# Patient Record
Sex: MALE | Race: WHITE
[De-identification: names, ages, dates, MRNs, and addresses within clinical notes are randomized per-mention and may not be internally consistent; named-entity substitution may affect disease eponyms.]

---

## 2021-11-17 LAB
BUN BLD-MCNC: 16 MG/DL (ref 7–18)
GLUCOSE SERPLBLD-MCNC: 97 MG/DL (ref 74–106)
HCT VFR BLD CALC: 45.6 % (ref 39.6–49)
INR BLD: 0.97
LYMPHOCYTES # SPEC AUTO: 0.9 K/UL (ref 0.7–4.9)
PMV BLD: 7.2 FL (ref 7.6–11.3)
POTASSIUM SERPL-SCNC: 4.5 MMOL/L (ref 3.5–5.1)
RBC # BLD: 4.88 M/UL (ref 4.33–5.43)

## 2021-11-17 NOTE — RAD REPORT
EXAM DESCRIPTION:  RAD - Chest Pa And Lat (2 Views) - 11/17/2021 9:38 am

 

CLINICAL HISTORY:  Pre Op pending heart catheterization

 

COMPARISON:  CHEST PA AND LAT 2 VIEW dated 4/24/2014; CHEST PA AND LAT 2 VIEW dated 9/17/2012

 

FINDINGS:  Lines: None.

Lungs: No evidence of edema or pneumonia.

Pleural: No significant pleural effusions or pneumothorax.

Cardiac: The heart size is within normal limits.

Bones: No acute fractures.

Other:

 

IMPRESSION:  No acute cardiopulmonary disease.

## 2021-11-17 NOTE — EKG
Test Date:    2021-11-17               Test Time:    08:54:45

Technician:   MADELEINE                                     

                                                     

MEASUREMENT RESULTS:                                       

Intervals:                                           

Rate:         49                                     

AR:           158                                    

QRSD:         78                                     

QT:           464                                    

QTc:          419                                    

Axis:                                                

P:            60                                     

AR:           158                                    

QRS:          72                                     

T:            64                                     

                                                     

INTERPRETIVE STATEMENTS:                                       

                                                     

Marked sinus bradycardia with premature atrial complexes

Abnormal ECG

No previous ECG available for comparison



Electronically Signed On 11-17-21 16:56:56 CST by Frank Fry

## 2021-11-18 ENCOUNTER — HOSPITAL ENCOUNTER (OUTPATIENT)
Dept: HOSPITAL 97 - CCL | Age: 76
Discharge: HOME | End: 2021-11-18
Attending: INTERNAL MEDICINE
Payer: COMMERCIAL

## 2021-11-18 VITALS — DIASTOLIC BLOOD PRESSURE: 75 MMHG | SYSTOLIC BLOOD PRESSURE: 152 MMHG

## 2021-11-18 VITALS — OXYGEN SATURATION: 97 %

## 2021-11-18 VITALS — TEMPERATURE: 97.2 F

## 2021-11-18 DIAGNOSIS — Z20.822: ICD-10-CM

## 2021-11-18 DIAGNOSIS — E11.9: ICD-10-CM

## 2021-11-18 DIAGNOSIS — Z82.49: ICD-10-CM

## 2021-11-18 DIAGNOSIS — Z87.891: ICD-10-CM

## 2021-11-18 DIAGNOSIS — I10: ICD-10-CM

## 2021-11-18 DIAGNOSIS — R06.02: Primary | ICD-10-CM

## 2021-11-18 PROCEDURE — 82947 ASSAY GLUCOSE BLOOD QUANT: CPT

## 2021-11-18 PROCEDURE — 93005 ELECTROCARDIOGRAM TRACING: CPT

## 2021-11-18 PROCEDURE — 85610 PROTHROMBIN TIME: CPT

## 2021-11-18 PROCEDURE — 93460 R&L HRT ART/VENTRICLE ANGIO: CPT

## 2021-11-18 PROCEDURE — 85025 COMPLETE CBC W/AUTO DIFF WBC: CPT

## 2021-11-18 PROCEDURE — 85730 THROMBOPLASTIN TIME PARTIAL: CPT

## 2021-11-18 PROCEDURE — 71046 X-RAY EXAM CHEST 2 VIEWS: CPT

## 2021-11-18 PROCEDURE — 80048 BASIC METABOLIC PNL TOTAL CA: CPT

## 2021-11-18 PROCEDURE — 36415 COLL VENOUS BLD VENIPUNCTURE: CPT

## 2021-11-18 NOTE — OP
Date of Procedure:  11/18/2021



Surgeon:  SHAE SINGH



Procedures Performed:  

1.Selective coronary angiogram.

2.Left heart catheterization.

3.Right heart catheterization.



Indication:  Significant dyspnea on exertion.



Access:  

1.Right radial artery 6-[place] closed with TR band.

2.Right IJ 7-[place] closed with manual pressure.



Complications:  None.



Bleeding:  Less than 10 mL.



Description Of Procedure:  After risks, benefits, and alternatives were explained, the patient agreed
 to procedure and signed informed consent.  The patient was brought into the cardiac catheterization 
laboratory, prepped and draped in usual sterile fashion.  Then, I access right radial artery using pe
diatric micropuncture kit, placed 6-[place] Slender sheath, and took a 5-[place] Tiger 4.0 catheter int
o the aortic root, engaged the left main and right coronary artery, and then took standard views and 
subsequently advanced the catheter over the wire across aortic valve into the LV, recorded LVEDP.  Pu
llback did not record any gradient.  I took a 7-[place] balloon-tipped Lincoln-Rosana catheter through the 
IJ into the RA, RV, PA, which recorded waveform pressure and measured cardiac output through thermodi
lution method and then removed the sheath.  Manual pressure was used for hemostasis.



Findings:  

1.Left main is large, normal.

2.LAD; moderate size and normal.  All diagonal branches are normal.

3.Left circumflex is normal.

4.RCA is normal and dominant, circulation.

5.LVEDP of 13 mmHg. 

Right heart catheterization numbers:  RA pressure was 4.  RV pressure 25/1 with mean of 3.  PA pressu
re was 26/90 with mean of 16.  Pulmonary wedge pressure was 4 and cardiac output was 5 L/minute.



Conclusion:  

1.Normal coronary arteries.

2.Normal filling pressures and normal cardiac output.



Recommendation:  Consult Pulmonary to further evaluate the symptoms.





SR/MODL

DD:  11/18/2021 13:30:08Voice ID:  [id number]

DT:  11/18/2021 17:55:50Report ID:  [id number] Detail Level: Detailed

## 2022-05-11 LAB — UA DIPSTICK W REFLEX MICRO PNL UR: (no result)

## 2022-05-11 NOTE — RAD REPORT
EXAM DESCRIPTION:  RAD - Chest Pa And Lat (2 Views) - 5/11/2022 9:25 am

 

CLINICAL HISTORY:  pre op for surgery

Chest pain.

 

COMPARISON:  Chest Pa And Lat (2 Views) dated 11/17/2021; CHEST PA AND LAT 2 VIEW dated 4/24/2014; CH
EST PA AND LAT 2 VIEW dated 9/17/2012

 

FINDINGS:  The lungs are clear. The heart is normal in size. No displaced fractures.

 

IMPRESSION:  No acute or concerning finding suspected.

## 2022-05-12 NOTE — EKG
Test Date:    2022-05-11               Test Time:    08:23:32

Technician:   YAN                                     

                                                     

MEASUREMENT RESULTS:                                       

Intervals:                                           

Rate:         44                                     

AZ:           168                                    

QRSD:         78                                     

QT:           468                                    

QTc:          400                                    

Axis:                                                

P:            50                                     

AZ:           168                                    

QRS:          45                                     

T:            52                                     

                                                     

INTERPRETIVE STATEMENTS:                                       

                                                     

Sinus bradycardia

Otherwise normal ECG

Compared to ECG 11/17/2021 08:54:45

Atrial premature complex(es) no longer present



Electronically Signed On 05-12-22 07:43:36 CDT by Frank Fry

## 2022-05-13 LAB
ALBUMIN SERPL BCP-MCNC: 3.8 G/DL (ref 3.4–5)
ALP SERPL-CCNC: 86 U/L (ref 45–117)
ALT SERPL W P-5'-P-CCNC: 17 U/L (ref 12–78)
AST SERPL W P-5'-P-CCNC: 13 U/L (ref 15–37)
BUN BLD-MCNC: 17 MG/DL (ref 7–18)
GLUCOSE SERPLBLD-MCNC: 112 MG/DL (ref 74–106)
HCT VFR BLD CALC: 42.8 % (ref 39.6–49)
INR BLD: 0.92
LYMPHOCYTES # SPEC AUTO: 0.9 K/UL (ref 0.7–4.9)
PMV BLD: 7.1 FL (ref 7.6–11.3)
POTASSIUM SERPL-SCNC: 4.2 MMOL/L (ref 3.5–5.1)
RBC # BLD: 4.66 M/UL (ref 4.33–5.43)

## 2022-05-17 ENCOUNTER — HOSPITAL ENCOUNTER (OUTPATIENT)
Dept: HOSPITAL 97 - OR | Age: 77
Setting detail: OBSERVATION
LOS: 1 days | Discharge: HOME HEALTH | End: 2022-05-18
Attending: ORTHOPAEDIC SURGERY | Admitting: ORTHOPAEDIC SURGERY
Payer: COMMERCIAL

## 2022-05-17 VITALS — OXYGEN SATURATION: 97 %

## 2022-05-17 VITALS — BODY MASS INDEX: 35.7 KG/M2

## 2022-05-17 DIAGNOSIS — M17.11: Primary | ICD-10-CM

## 2022-05-17 DIAGNOSIS — I10: ICD-10-CM

## 2022-05-17 DIAGNOSIS — K21.9: ICD-10-CM

## 2022-05-17 DIAGNOSIS — Z20.822: ICD-10-CM

## 2022-05-17 DIAGNOSIS — F41.9: ICD-10-CM

## 2022-05-17 DIAGNOSIS — F32.A: ICD-10-CM

## 2022-05-17 DIAGNOSIS — R73.03: ICD-10-CM

## 2022-05-17 DIAGNOSIS — E03.9: ICD-10-CM

## 2022-05-17 DIAGNOSIS — M06.9: ICD-10-CM

## 2022-05-17 DIAGNOSIS — Z80.9: ICD-10-CM

## 2022-05-17 DIAGNOSIS — J30.2: ICD-10-CM

## 2022-05-17 DIAGNOSIS — Z79.84: ICD-10-CM

## 2022-05-17 DIAGNOSIS — Z85.818: ICD-10-CM

## 2022-05-17 DIAGNOSIS — Z79.899: ICD-10-CM

## 2022-05-17 DIAGNOSIS — N40.0: ICD-10-CM

## 2022-05-17 DIAGNOSIS — Z96.642: ICD-10-CM

## 2022-05-17 DIAGNOSIS — E78.00: ICD-10-CM

## 2022-05-17 DIAGNOSIS — Z82.0: ICD-10-CM

## 2022-05-17 DIAGNOSIS — Z82.49: ICD-10-CM

## 2022-05-17 PROCEDURE — 97110 THERAPEUTIC EXERCISES: CPT

## 2022-05-17 PROCEDURE — 97139 UNLISTED THERAPEUTIC PX: CPT

## 2022-05-17 PROCEDURE — 86850 RBC ANTIBODY SCREEN: CPT

## 2022-05-17 PROCEDURE — 97116 GAIT TRAINING THERAPY: CPT

## 2022-05-17 PROCEDURE — 36415 COLL VENOUS BLD VENIPUNCTURE: CPT

## 2022-05-17 PROCEDURE — 83036 HEMOGLOBIN GLYCOSYLATED A1C: CPT

## 2022-05-17 PROCEDURE — 86900 BLOOD TYPING SEROLOGIC ABO: CPT

## 2022-05-17 PROCEDURE — 27447 TOTAL KNEE ARTHROPLASTY: CPT

## 2022-05-17 PROCEDURE — 88305 TISSUE EXAM BY PATHOLOGIST: CPT

## 2022-05-17 PROCEDURE — 0SRC069 REPLACEMENT OF RIGHT KNEE JOINT WITH OXIDIZED ZIRCONIUM ON POLYETHYLENE SYNTHETIC SUBSTITUTE, CEMENTED, OPEN APPROACH: ICD-10-PCS

## 2022-05-17 PROCEDURE — 85014 HEMATOCRIT: CPT

## 2022-05-17 PROCEDURE — 88311 DECALCIFY TISSUE: CPT

## 2022-05-17 PROCEDURE — 94010 BREATHING CAPACITY TEST: CPT

## 2022-05-17 PROCEDURE — 80053 COMPREHEN METABOLIC PANEL: CPT

## 2022-05-17 PROCEDURE — 85730 THROMBOPLASTIN TIME PARTIAL: CPT

## 2022-05-17 PROCEDURE — 85610 PROTHROMBIN TIME: CPT

## 2022-05-17 PROCEDURE — 93005 ELECTROCARDIOGRAM TRACING: CPT

## 2022-05-17 PROCEDURE — 85018 HEMOGLOBIN: CPT

## 2022-05-17 PROCEDURE — 81003 URINALYSIS AUTO W/O SCOPE: CPT

## 2022-05-17 PROCEDURE — 85025 COMPLETE CBC W/AUTO DIFF WBC: CPT

## 2022-05-17 PROCEDURE — 97161 PT EVAL LOW COMPLEX 20 MIN: CPT

## 2022-05-17 PROCEDURE — 86901 BLOOD TYPING SEROLOGIC RH(D): CPT

## 2022-05-17 PROCEDURE — 71046 X-RAY EXAM CHEST 2 VIEWS: CPT

## 2022-05-17 RX ADMIN — METOPROLOL TARTRATE SCH MG: 50 TABLET, FILM COATED ORAL at 21:30

## 2022-05-17 RX ADMIN — HYDROCODONE BITARTRATE AND ACETAMINOPHEN PRN TAB: 7.5; 325 TABLET ORAL at 18:28

## 2022-05-17 RX ADMIN — SODIUM CHLORIDE SCH MLS: 9 INJECTION, SOLUTION INTRAVENOUS at 16:47

## 2022-05-17 RX ADMIN — TRANEXAMIC ACID ONE MG: 100 INJECTION, SOLUTION INTRAVENOUS at 07:30

## 2022-05-17 RX ADMIN — TRANEXAMIC ACID ONE MG: 100 INJECTION, SOLUTION INTRAVENOUS at 09:30

## 2022-05-17 NOTE — P.BOP
Preoperative diagnosis: right knee arthritis


Primary procedure: right total knee arthoplasty


Anesthesia: General


Complications: None


Transferred to: Recovery Room


Condition: Good

## 2022-05-17 NOTE — XMS REPORT
Continuity of Care Document

                             Created on:May 17, 2022



Patient:JOHN SPAIN

Sex:Male

:1945

External Reference #:[id number]





Demographics







                          Address                   [address] [place] [address] [address] [place] [id number]

 

                          Home Phone                [phone]

 

                          Work Phone                [phone]

 

                          Email Address             [facility] 945593

 

                          Preferred Language        English

 

                          Marital Status            Unknown

 

                          Shinto Affiliation     Unknown

 

                          Race                      Unknown

 

                          Additional Race(s)        Unavailable

 

                          Ethnic Group              Unknown









Author







                          Organization              [facility]

t

 

                          Address                   [address] [place] [address] [address]



                                                    [place] [address]

 

                          Phone                     [phone]









Care Team Providers







                    Name                Role                Phone

 

                    John-Mbayo_A_AH    Attending Clinician Unavailable

 

                    John-Mbayo_A_AH    Admitting Clinician Unavailable









Payers







           Payer Name Policy Type Policy Number Effective Date Expiration Date S

bee

 

           [facility] [place] -            [id number]   2020            



           [employer]                        00:00:00              



           (MEDICARE                                              



           REPLACEMENT/ADVANT                                             



           AGE - HMO)                                             







Problems

This patient has no known problems.



Allergies, Adverse Reactions, Alerts

This patient has no known allergies or adverse reactions.



Medications

This patient has no known medications.



Procedures

This patient has no known procedures.



Encounters







        Start   End     Encounter Admission Attending Care    Care    Encounter 

Source



        Date/Time Date/Time Type    Type    Clinicians Facility Department ID   

   

 

        2022-10         Outpatient                 [facility]  [id number]

 Common



        11:46:01                                                 [id number]   [facility]

 

        2022         [facility]                 [facility]  [id number]

 Common



        13:54:04                                                 [id number]   [facility]

 

        2020 Outpatient         John-Mbayo VFP     VFP     [phone]

[phone] [facility]



        07:18:00 07:18:00                 _A_AH                   [id number]   Family



                                                                        Practic



                                                                        e

 

        2020 Outpatient         John-Mbayo VFP     VFP     [phone]

[phone] [facility]



        07:18:00 07:18:00                 _A_AH                   [id number]   Family



                                                                        Practic



                                                                        e

 

        2020 Outpatient         John-Mbayo VFP     VFP     [phone]

[phone] [facility]



        07:18:00 07:18:00                 _A_AH                   [id number]   Family



                                                                        Practic



                                                                        e







Results

This patient has no known results.

## 2022-05-17 NOTE — OP
Date of Procedure:  05/17/2022



Surgeon:  Hansel Carroll MD



Preoperative Diagnosis:  Right severe osteoarthritis of the knee.



Postoperative Diagnosis:  Right severe osteoarthritis of the knee.



Procedure:  Right total knee arthroplasty using the Vanguard PS cemented system.



Estimated Blood Loss:  100 cc.



Complications:  There were no complications.



Specimen:  No pathology specimen sent.



Indications For Operation:  Mr. Faria is a 76-year-old gentleman who unfortunately had significant
 pain and problems related to his right knee, which persisted despite conservative measures including
 medications and injections as well as activity modification.  X-rays demonstrate severe osteoarthrit
is primarily involving the medial side, but also coughing lateral as well.  Risks, benefits, and alte
rnatives to different methods of treating have been discussed with the patient including total knee a
rthroplasty and alternatives.  At this time, he elects for total knee arthroplasty and agrees to proc
eed.



Description Of Procedure:  The patient was taken to the operating room and placed in supine position.
  General anesthesia was obtained by Anesthesia staff.  Well-padded tourniquet placed on superior rig
ht thigh.  Right lower extremity was then prepped and draped in the usual fashion procedure.  Followi
ng this, the leg was then elevated and gently exsanguinated using an Ace wrap.  The knee was then elodia
t and tourniquet was raised.  A standard anterior incision was taken down carefully through skin and 
soft tissues.  Meticulous hemostasis being maintained using Bovie electrocautery.  The proper plane w
as encountered and gently swept off the anterior surface of the knee.  After this, the superior media
l pole of the patella was marked and a standard medial parapatellar arthrotomy was then performed.  T
here was liberation of approximately 20 cc of rather normal-appearing synovial fluid.  This was follo
wed by some removal of fat pad to allow for visualization.  Some of it appears to have scarred down w
ithin the knee.  The patella was then everted.  The knee was flexed and the medial lateral menisci, a
s well as the anterior cruciate ligament removed.  After this, attention was then turned to the femur
 and the medial side was completely worn down.  The intramedullary guide was then placed in standard 
fashion and the distal femur was then cut, was then sized to a size 70.  The remainder of the cuts we
re then made.  Attention was then turned to the tibia and the tibia was then cut in standard fashion.
  It was then sized to a size 83.  Trial components were in place.  The knee was then brought to exte
nsion.  It appeared to be balanced both medial and laterally.  The patella was then inspected and did
 not have much arthritic change, but there was some.  Decision was made to resurface this.  This was 
calipered and cut and the trial patellas in place.  The knee was then brought through full range of m
otion and the patella appeared to glide well.  The trial components were then removed.  The box was c
ut.  The bone plug was placed.  The tibia was punched.  The components with the exception of the poly
ethylene  were then placed using standard cementing technique, removal of any unsupported cement.  Th
e trial poly was then placed until the construct cartons.  Once this was done, was brought through fu
ll range of motion and again checked.  It appears to come to full extension without difficulty with f
ull flexion and appears to be stable in both varus and valgus stress.  This is all selected as a abdiel
l polyethylene.  Trial polyethylene was removed.  The final polyethylene was then placed, placed in a
 locking bar.  The wound was copiously irrigated and the fascia was closed in a watertight fashion us
ing heavy Ethibond sutures.  It was again irrigated and the skin was closed using Vicryl followed by 
staples.  The patient was then placed in a well-padded sterile dressing, awakened, and taken to [facility]
reese room in good condition.  There were no complications.





/MODL

DD:  05/17/2022 09:46:53Voice ID:  [id number]

DT:  05/17/2022 20:54:08Report ID:  [id number]

## 2022-05-18 VITALS — TEMPERATURE: 98.5 F | DIASTOLIC BLOOD PRESSURE: 61 MMHG | SYSTOLIC BLOOD PRESSURE: 118 MMHG

## 2022-05-18 LAB — HCT VFR BLD CALC: 38.9 % (ref 39.6–49)

## 2022-05-18 RX ADMIN — HYDROCODONE BITARTRATE AND ACETAMINOPHEN PRN TAB: 7.5; 325 TABLET ORAL at 00:11

## 2022-05-18 RX ADMIN — SODIUM CHLORIDE SCH MLS: 9 INJECTION, SOLUTION INTRAVENOUS at 09:46

## 2022-05-18 RX ADMIN — HYDROCODONE BITARTRATE AND ACETAMINOPHEN PRN TAB: 7.5; 325 TABLET ORAL at 11:39

## 2022-05-18 RX ADMIN — METOPROLOL TARTRATE SCH MG: 50 TABLET, FILM COATED ORAL at 09:46

## 2022-05-18 RX ADMIN — ENOXAPARIN SODIUM SCH MG: 30 INJECTION SUBCUTANEOUS at 05:32

## 2022-05-18 RX ADMIN — SODIUM CHLORIDE SCH MLS: 9 INJECTION, SOLUTION INTRAVENOUS at 00:12

## 2022-05-18 RX ADMIN — ENOXAPARIN SODIUM SCH MG: 30 INJECTION SUBCUTANEOUS at 09:47

## 2022-05-18 NOTE — P.DS
Discharge Date: 05/18/22


Disposition: ROUTINE DISCHARGE


Discharge Condition: GOOD


Reason for Admission: Right total knee arthroplasty


Consultations: 





[place]





- Problems


(1) Status post total right knee replacement


Current Visit: Yes   Status: Acute   





(2) Hypertension


Current Visit: Yes   Status: Acute   





(3) Rheumatoid arthritis


Current Visit: Yes   Status: Acute   


Brief History of Present Illness: 





Patient is a 76-year-old gentleman who has a history of right knee 

osteoarthritis.  Patient was scheduled for right total knee arthroplasty.  

Patient came into the hospital for surgery.  Patient has a host of medical 

problems.  Patient will be restarted on home medications.  Patient will be 

admitted to the hospital for further evaluation.


Hospital Course: 





Patient continues to do well.  At this time, patient is stable for discharge.  

Patient will continue with outpatient follow-up with DVT and pain meds and 

physical therapy.


Vital Signs/Physical Exam: 














Temp Pulse Resp BP Pulse Ox


 


 97.3 F   60   18   136/61   98 


 


 05/18/22 08:00  05/18/22 09:47  05/18/22 12:34  05/18/22 09:47  05/18/22 12:34








General: Alert, In no apparent distress, Oriented x3


Laboratory Data at Discharge: 














WBC  6.7 K/uL (4.3-10.9)   05/13/22  08:45    


 


Hgb  13.3 g/dL (13.6-17.9)  L  05/18/22  05:15    


 


Hct  38.9 % (39.6-49.0)  L  05/18/22  05:15    


 


Plt Count  187 K/uL (152-406)   05/13/22  08:45    


 


PT  10.1 SECONDS (9.5-12.5)   05/13/22  08:45    


 


INR  0.92   05/13/22  08:45    


 


APTT  37.4 SECONDS (24.3-36.9)  H  05/13/22  08:45    


 


Sodium  137 mmol/L (136-145)   05/13/22  08:45    


 


Potassium  4.2 mmol/L (3.5-5.1)   05/13/22  08:45    


 


BUN  17 mg/dL (7-18)   05/13/22  08:45    


 


Creatinine  1.50 mg/dL (0.55-1.3)  H  05/13/22  08:45    


 


Glucose  112 mg/dL ()  H  05/13/22  08:45    


 


Total Bilirubin  0.4 mg/dL (0.2-1.0)   05/13/22  08:45    


 


AST  13 U/L (15-37)  L  05/13/22  08:45    


 


ALT  17 U/L (12-78)   05/13/22  08:45    


 


Alkaline Phosphatase  86 U/L ()   05/13/22  08:45    








Home Medications: 








Lisinopril/Hydrochlorothiazide [Zestoretic 20-12.5 mg Tablet] 1 each PO DAILY 

03/30/15 


Trazodone [Desyrel*] 100 mg PO BEDTIME 03/30/15 


Amlodipine Besylate 1 tab PO DAILY 05/11/22 


Levothyroxine [Synthroid*] 1 tab PO DAILY 05/11/22 


Meloxicam [Mobic] 1 tab PO DAILY 05/11/22 


Metoprolol Tartrate 1 tab PO BID 05/11/22 


Pravastatin [Pravachol*] 1 tab PO DAILY 05/11/22 


Sertraline [Zoloft*] 1 tab PO BEDTIME 05/11/22 


Sertraline [Zoloft*] 1 tab PO BEDTIME 05/11/22 


Tamsulosin HCl [Flomax] 2 tab PO BEDTIME 05/11/22 


Docusate [Colace Cap*] 100 mg PO DAILY PRN #20 cap 05/18/22 


Hydrocodone 7.5/APAP 325 [Norco 7.5/325 mg*] 1 tab PO Q6HP PRN #30 tab 05/18/22 


Rivaroxaban [Xarelto] 10 mg PO DAILY #20 tablet 05/18/22 





New Medications: 


Docusate [Colace Cap*] 100 mg PO DAILY PRN #20 cap


 PRN Reason: Constipation


Hydrocodone 7.5/APAP 325 [Norco 7.5/325 mg*] 1 tab PO Q6HP PRN #30 tab


 PRN Reason: Pain Scale 5-7 (Moderate)


Rivaroxaban [Xarelto] 10 mg PO DAILY #20 tablet


Physician Discharge Instructions: 


-DC IV and DC home


-Follow-up with PCP in 1 to 2 weeks


-Follow-up with Orthopedics in 1 week


-Please call Dr. Villeda at [phone] if any questions regarding hospital stay


-Please call nursing station at [phone] if any nursing or medication 

questions


-Return to the emergency room if symptoms worsen


Diet: Regular


Activity: Fall precautions


Followup: 


Hansel Carroll MD [ACTIVE - CAN ADMIT] - 1 Week (Call to schedule 

appointment)


Ad Medrano MD [Primary Care Provider] - 1-2 Weeks (Call to schedule bryan

ointment)


Time spent managing pt's care (in minutes): 35

## 2022-05-18 NOTE — P.CNS
Date of Consult: 05/17/22


Reason for Consult: Assistance with medical management


Requesting Physician: Hansel Carroll


Chief Complaint: Right total knee arthroplasty


History of Present Illness: 





Patient is a 76-year-old gentleman who has a history of right knee 

osteoarthritis.  Patient was scheduled for right total knee arthroplasty.  

Patient came into the hospital for surgery.  Patient has a host of medical 

problems.  Patient will be restarted on home medications.  Patient will be 

admitted to the hospital for further evaluation.


Allergies





No Known Allergies Allergy (Verified 05/17/22 06:05)


   





Home Medications: 








Lisinopril/Hydrochlorothiazide [Zestoretic 20-12.5 mg Tablet] 1 each PO DAILY 

03/30/15 


Trazodone [Desyrel*] 100 mg PO BEDTIME 03/30/15 


Amlodipine Besylate 1 tab PO DAILY 05/11/22 


Levothyroxine [Synthroid*] 1 tab PO DAILY 05/11/22 


Meloxicam [Mobic] 1 tab PO DAILY 05/11/22 


Metoprolol Tartrate 1 tab PO BID 05/11/22 


Pravastatin [Pravachol*] 1 tab PO DAILY 05/11/22 


Sertraline [Zoloft*] 1 tab PO BEDTIME 05/11/22 


Sertraline [Zoloft*] 1 tab PO BEDTIME 05/11/22 


Tamsulosin HCl [Flomax] 2 tab PO BEDTIME 05/11/22 


Docusate [Colace Cap*] 100 mg PO DAILY PRN #20 cap 05/18/22 


Hydrocodone 7.5/APAP 325 [Norco 7.5/325 mg*] 1 tab PO Q6HP PRN #30 tab 05/18/22 


Rivaroxaban [Xarelto] 10 mg PO DAILY #20 tablet 05/18/22 








- Past Medical/Surgical History


Diabetic: No


-: HTN


-: GERD


-: Thyroid Disease


-: Throat Cancer


-: Rhuematoid Arthritis


-: HDL


-: left hip replacement 2013


-: appendix removed as teen ager


-: tooth pulled 1 wk ago





- Family History


  ** Sister


Medical History: Cancer


Notes: father dementia





  ** Mother


Notes: Dementia





  ** Father


Notes: Dementia





- Social History


Alcohol use: Yes


CD- Drugs: No


Caffeine use: Yes


Place of Residence: Home





Review of Systems


10-point ROS is otherwise unremarkable





Physical Examination














Temp Pulse Resp BP Pulse Ox


 


 97.3 F   60   18   136/61   98 


 


 05/18/22 08:00  05/18/22 09:47  05/18/22 12:34  05/18/22 09:47  05/18/22 12:34








General: Alert, In no apparent distress, Oriented x3


HEENT: Atraumatic, PERRLA, Mucous membr. moist/pink, EOMI, Sclerae nonicteric


Neck: Supple, 2+ carotid pulse no bruit, No LAD, Without JVD or thyroid 

abnormality


Respiratory: Clear to auscultation bilaterally, Normal air movement


Cardiovascular: Regular rate/rhythm, Normal S1 S2


Gastrointestinal: Normal bowel sounds, Soft and benign, Non-distended, No 

tenderness


Musculoskeletal: Tenderness


Integumentary: No rashes


Neurological: Normal strength at 5/5 x4 extr, Normal tone, Sensation intact, 

Cranial nerves 3-12 intact, Abnormal gait


Lymphatics: No axilla or inguinal lymphadenopathy


Laboratory Data (last 24 hrs)





05/18/22 05:15: Hgb 13.3 L, Hct 38.9 L








- Problems


(1) Status post total right knee replacement


Current Visit: Yes   Status: Acute   





(2) Hypertension


Current Visit: Yes   Status: Acute   





(3) Rheumatoid arthritis


Current Visit: Yes   Status: Acute   


Conclusions/ Impression: 





PLAN:


1. Pain control


2. IVFs 


3. DVT prophylaxis


4. Resume home meds


5. GI/DVT prophylaxis


Critical Care: No


Time Spent Managing Pts care (In Minutes): 45

## 2022-11-14 LAB
BUN BLD-MCNC: 17 MG/DL (ref 7–18)
GLUCOSE SERPLBLD-MCNC: 109 MG/DL (ref 74–106)
HCT VFR BLD CALC: 42.9 % (ref 39.6–49)
INR BLD: 0.95
LYMPHOCYTES # SPEC AUTO: 1 K/UL (ref 0.7–4.9)
MCV RBC: 90.6 FL (ref 80–100)
PMV BLD: 7 FL (ref 7.6–11.3)
POTASSIUM SERPL-SCNC: 4.3 MMOL/L (ref 3.5–5.1)
RBC # BLD: 4.74 M/UL (ref 4.33–5.43)

## 2022-11-14 NOTE — RAD REPORT
EXAM DESCRIPTION:  Debby Chávez (2 Views)11/14/2022 10:33 am

 

CLINICAL HISTORY:  Preop for urolift surgery/hypertension

 

COMPARISON:  May 2022

 

FINDINGS:   The lungs appear clear of acute infiltrate. The heart is normal size

 

IMPRESSION:   No acute abnormalities displayed

## 2022-11-22 ENCOUNTER — HOSPITAL ENCOUNTER (OUTPATIENT)
Dept: HOSPITAL 97 - OR | Age: 77
Discharge: HOME | End: 2022-11-22
Attending: UROLOGY
Payer: COMMERCIAL

## 2022-11-22 VITALS — DIASTOLIC BLOOD PRESSURE: 65 MMHG | TEMPERATURE: 98 F | OXYGEN SATURATION: 98 % | SYSTOLIC BLOOD PRESSURE: 119 MMHG

## 2022-11-22 DIAGNOSIS — K21.9: ICD-10-CM

## 2022-11-22 DIAGNOSIS — N40.1: Primary | ICD-10-CM

## 2022-11-22 DIAGNOSIS — E78.5: ICD-10-CM

## 2022-11-22 DIAGNOSIS — I10: ICD-10-CM

## 2022-11-22 PROCEDURE — 52442 CYSTO INS TRNSPRSTC IMPLT EA: CPT

## 2022-11-22 PROCEDURE — 85025 COMPLETE CBC W/AUTO DIFF WBC: CPT

## 2022-11-22 PROCEDURE — 51700 IRRIGATION OF BLADDER: CPT

## 2022-11-22 PROCEDURE — 0T7D8DZ DILATION OF URETHRA WITH INTRALUMINAL DEVICE, VIA NATURAL OR ARTIFICIAL OPENING ENDOSCOPIC: ICD-10-PCS

## 2022-11-22 PROCEDURE — 36415 COLL VENOUS BLD VENIPUNCTURE: CPT

## 2022-11-22 PROCEDURE — 80048 BASIC METABOLIC PNL TOTAL CA: CPT

## 2022-11-22 PROCEDURE — 85610 PROTHROMBIN TIME: CPT

## 2022-11-22 PROCEDURE — 52441 CYSTO INSJ TRNSPRSTC 1 IMPLT: CPT

## 2022-11-22 PROCEDURE — 87088 URINE BACTERIA CULTURE: CPT

## 2022-11-22 PROCEDURE — 87086 URINE CULTURE/COLONY COUNT: CPT

## 2022-11-22 PROCEDURE — 71046 X-RAY EXAM CHEST 2 VIEWS: CPT

## 2022-11-22 PROCEDURE — 3C1ZX8Z IRRIGATION OF INDWELLING DEVICE USING IRRIGATING SUBSTANCE, EXTERNAL APPROACH: ICD-10-PCS

## 2022-11-22 NOTE — OP
Surgeon:  RULA ROSS



Preoperative Diagnosis:  Benign prostatic hypertrophy with lower urinary tract obstruction and sympto
ms.



Postoperative Diagnosis:  Benign prostatic hypertrophy with lower urinary tract obstruction and sympt
oms.



Principal Procedure:  Prostatic urethral lift/UroLift with 6 implants placed.



Indication For Procedure:  Mr. Faria is a 77-year-old gentleman with LUTS due to BPH, refractory t
o medical therapy.  He underwent evaluation, was counseled on options for management of his obstructi
on and elected to proceed with the prostatic urethral lift.



Procedure In Detail:  The patient was consented in the preoperative holding area before being transfe
rred to the operative suite where general anesthesia was induced.  He was given Ancef 2 g IV antimicr
obial prophylaxis, and Pneumoboots were provided for DVT prophylaxis.  He was placed in the lithotomy
 position, padded and secured to the table appropriately, and his genitalia was prepped with Hibiclen
s before being draped in standard fashion.  The case was begun using a 20-[place] UroLift obturator an
d scope to traverse the urethra and into the bladder with ease.  The bladder was surveyed and there w
ere no papillary mucosal lesions, foreign bodies, or stones.  The lateral lobar hypertrophy previousl
y observed was present, causing the obstruction as previously seen.  I began by identifying a region 
at the bladder neck approximately 1.5 cm distal to the bladder neck margin in the anterolateral posit
ion at around 10-11 o'clock for targeting with the first UroLift delivery device.  Angled about 10 de
grees kissing the lateral lobe, the trigger was deployed in delivering the needle containing the impl
ant through the prostate to the capsular surface.  The device was further angled laterally to complet
ely ensure delivery of the needle through the prostate and a second trigger was pulled to deliver the
 capsular tab to the surface of the prostate.  A third trigger pole then tensioned the monofilament b
efore I angled the device back toward the midline and advanced it 2 or 3 mm to observe the monofilame
nt white line centered in the delivery Rhea before applying the capsular tab with a fourth trigger and
 cutting the suture tailoring the size of the monofilament.  The urethral pad ended up in excellent p
osition, invaginated into the prostatic tissue with a margin of tissue approximately 0.5 cm distal to
 the bladder neck lumen.  An additional implant site was selected in the right surface of the prostat
e anterolaterally between the 10 and 11 o'clock position and was successfully delivered at that posit
ion as well.  I then placed 2 additional implants on each side of the verumontanum at the apex of the
 prostate.  I then utilized the visual obturator to survey the channel, and there was still residual 
lateral lobar hypertrophy in the mid gland region, which seemed to largely emanate from the left side
, so I targeted a fifth implant in the region of that mid gland tissue anterolaterally and successful
ly lateralizing that tissue with a fifth implant.  Repeat survey with the visual obturator has now sh
own some invaginating intraurethral tissue at this time from the right side in the mid gland region; 
so a 6th implant was delivered to that tissue on the right side.  Again, survey with the visual obtur
ator in the bladder decompressed, did demonstrate a completely patent anterior channel from the verum
ontanum through to the bladder neck with the bladder completely decompressed and no fluid inflow.  As
 a result, I then backfilled his bladder with saline before removing the scope and passing a 22-Frenc
h Simms catheter into his bladder, mostly because of some ongoing hematuria that did make visualizati
on somewhat challenging at different points during the procedure.  30 cc of sterile water was placed 
in the balloon and the patient was taken out of the lithotomy position.  The catheter was connected t
o a leg bag after irrigating it to ensure absence of any clots.  The patient was then transferred to 
a stretcher before being transferred to the recovery room in good condition.



Complications:  None.



Discharge Disposition:  He will be given a voiding trial before discharge and if successfully able to
 void, he may simply be seen in followup in about a month's time. 



Additional discharge disposition instructions:  He will also be discharged with a Medrol Dosepak in a
ddition to Bactrim and Tylenol with Codeine.





WR/MODL

DD:  11/22/2022 11:15:03Voice ID:  [id number]

DT:  11/22/2022 18:23:23Report ID:  [id number]